# Patient Record
Sex: MALE | Race: WHITE
[De-identification: names, ages, dates, MRNs, and addresses within clinical notes are randomized per-mention and may not be internally consistent; named-entity substitution may affect disease eponyms.]

---

## 2023-07-03 ENCOUNTER — HOSPITAL ENCOUNTER (OUTPATIENT)
Dept: HOSPITAL 75 - RAD | Age: 63
End: 2023-07-03
Attending: NURSE PRACTITIONER
Payer: MEDICARE

## 2023-07-03 DIAGNOSIS — M48.061: ICD-10-CM

## 2023-07-03 DIAGNOSIS — M47.26: Primary | ICD-10-CM

## 2023-07-03 DIAGNOSIS — M51.16: ICD-10-CM

## 2023-07-03 DIAGNOSIS — M89.38: ICD-10-CM

## 2023-07-03 PROCEDURE — 72148 MRI LUMBAR SPINE W/O DYE: CPT

## 2023-07-03 NOTE — DIAGNOSTIC IMAGING REPORT
PROCEDURE: MRI lumbar spine.



TECHNIQUE: Multiplanar, multisequence MRI of the lumbar spine was

performed without contrast.



INDICATION:  Lumbar radiculopathy. 



COMPARISON: None.



FINDINGS: There are 5 lumbar-type vertebral bodies for the

purposes of this report. Normal alignment. Vertebral body heights

preserved. Normal bone marrow signal.



No abnormal signal in the conus which terminates at L1. Normal

morphology of the cauda equina. The pelvis and visualized

paravertebral soft tissues demonstrate no acute findings.

Partially visualized atrophic right kidney.



L1-L2: Mild facet arthropathy. Mild bilateral neural foraminal

narrowing. No spinal canal or lateral recess narrowing.



L2-L3: Mild facet arthropathy. No spinal canal or lateral recess

narrowing. Mild right neural foraminal narrowing.



L3-L4: Broad-based disc bulging and ligamentous hypertrophy

result in mild spinal canal narrowing. Mild right and moderate

left neural foraminal narrowing. No lateral recess narrowing.



L4-L5: Broad-based disc bulging ligamentous hypertrophy results

in moderate spinal canal stenosis. Moderate left and mild right

lateral recess narrowing. Moderate left and severe right neural

foraminal narrowing.



L5-S1: No spinal canal or lateral recess narrowing. No neural

foraminal narrowing.



IMPRESSION: 

1. Spondylotic changes result in moderate spinal canal stenosis

at L4-L5.

2. Scattered high-grade lateral recess and neural foraminal

narrowing detailed above.

3. No acute osseous findings.



Dictated by: 



  Dictated on workstation # [id number]